# Patient Record
Sex: MALE | Race: WHITE | Employment: OTHER | URBAN - METROPOLITAN AREA
[De-identification: names, ages, dates, MRNs, and addresses within clinical notes are randomized per-mention and may not be internally consistent; named-entity substitution may affect disease eponyms.]

---

## 2017-02-13 PROBLEM — R33.9 URINARY RETENTION: Status: ACTIVE | Noted: 2017-02-13

## 2018-03-26 ENCOUNTER — ANESTHESIA EVENT (OUTPATIENT)
Dept: SURGERY | Age: 78
End: 2018-03-26
Payer: MEDICARE

## 2018-03-26 RX ORDER — DEXTROMETHORPHAN HYDROBROMIDE, GUAIFENESIN 5; 100 MG/5ML; MG/5ML
1300 LIQUID ORAL
COMMUNITY

## 2018-03-27 ENCOUNTER — ANESTHESIA (OUTPATIENT)
Dept: SURGERY | Age: 78
End: 2018-03-27
Payer: MEDICARE

## 2018-03-27 ENCOUNTER — APPOINTMENT (OUTPATIENT)
Dept: GENERAL RADIOLOGY | Age: 78
End: 2018-03-27
Attending: UROLOGY
Payer: MEDICARE

## 2018-03-27 ENCOUNTER — HOSPITAL ENCOUNTER (OUTPATIENT)
Age: 78
Setting detail: OUTPATIENT SURGERY
Discharge: HOME | End: 2018-03-27
Attending: UROLOGY | Admitting: UROLOGY
Payer: MEDICARE

## 2018-03-27 VITALS
HEART RATE: 62 BPM | TEMPERATURE: 97.4 F | RESPIRATION RATE: 17 BRPM | BODY MASS INDEX: 34.25 KG/M2 | HEIGHT: 68 IN | OXYGEN SATURATION: 91 % | DIASTOLIC BLOOD PRESSURE: 51 MMHG | WEIGHT: 226 LBS | SYSTOLIC BLOOD PRESSURE: 133 MMHG

## 2018-03-27 PROCEDURE — 77030018836 HC SOL IRR NACL ICUM -A: Performed by: UROLOGY

## 2018-03-27 PROCEDURE — 74011250636 HC RX REV CODE- 250/636: Performed by: NURSE ANESTHETIST, CERTIFIED REGISTERED

## 2018-03-27 PROCEDURE — 74011250637 HC RX REV CODE- 250/637: Performed by: NURSE ANESTHETIST, CERTIFIED REGISTERED

## 2018-03-27 PROCEDURE — C1758 CATHETER, URETERAL: HCPCS | Performed by: UROLOGY

## 2018-03-27 PROCEDURE — 74011250636 HC RX REV CODE- 250/636

## 2018-03-27 PROCEDURE — 74011250637 HC RX REV CODE- 250/637: Performed by: UROLOGY

## 2018-03-27 PROCEDURE — 76060000039 HC ANESTHESIA 4 TO 4.5 HR: Performed by: UROLOGY

## 2018-03-27 PROCEDURE — 88305 TISSUE EXAM BY PATHOLOGIST: CPT | Performed by: UROLOGY

## 2018-03-27 PROCEDURE — 77030029290 HC ELECTRD LP CUT OCOA -F: Performed by: UROLOGY

## 2018-03-27 PROCEDURE — 77030020782 HC GWN BAIR PAWS FLX 3M -B: Performed by: UROLOGY

## 2018-03-27 PROCEDURE — 76010000175 HC OR TIME 4 TO 4.5 HR INTENSV-TIER 1: Performed by: UROLOGY

## 2018-03-27 PROCEDURE — 77030032490 HC SLV COMPR SCD KNE COVD -B: Performed by: UROLOGY

## 2018-03-27 PROCEDURE — 74011000250 HC RX REV CODE- 250

## 2018-03-27 PROCEDURE — 76210000006 HC OR PH I REC 0.5 TO 1 HR: Performed by: UROLOGY

## 2018-03-27 PROCEDURE — 77030003481 HC NDL BIOP GUN BARD -B: Performed by: UROLOGY

## 2018-03-27 PROCEDURE — G0416 PROSTATE BIOPSY, ANY MTHD: HCPCS | Performed by: UROLOGY

## 2018-03-27 PROCEDURE — 74011636320 HC RX REV CODE- 636/320: Performed by: UROLOGY

## 2018-03-27 PROCEDURE — 74420 UROGRAPHY RTRGR +-KUB: CPT

## 2018-03-27 PROCEDURE — 77030020268 HC MISC GENERAL SUPPLY: Performed by: UROLOGY

## 2018-03-27 PROCEDURE — 77030010509 HC AIRWY LMA MSK TELE -A: Performed by: ANESTHESIOLOGY

## 2018-03-27 PROCEDURE — 77030019927 HC TBNG IRR CYSTO BAXT -A: Performed by: UROLOGY

## 2018-03-27 PROCEDURE — C2617 STENT, NON-COR, TEM W/O DEL: HCPCS | Performed by: UROLOGY

## 2018-03-27 PROCEDURE — 77030020441 HC CATH IMGR TRQ BERN BSC -B: Performed by: UROLOGY

## 2018-03-27 PROCEDURE — 74011250636 HC RX REV CODE- 250/636: Performed by: UROLOGY

## 2018-03-27 PROCEDURE — 76210000021 HC REC RM PH II 0.5 TO 1 HR: Performed by: UROLOGY

## 2018-03-27 PROCEDURE — C1769 GUIDE WIRE: HCPCS | Performed by: UROLOGY

## 2018-03-27 DEVICE — URETERAL STENT
Type: IMPLANTABLE DEVICE | Site: URETER | Status: FUNCTIONAL
Brand: POLARIS™ ULTRA

## 2018-03-27 RX ORDER — MIDAZOLAM HYDROCHLORIDE 1 MG/ML
INJECTION, SOLUTION INTRAMUSCULAR; INTRAVENOUS AS NEEDED
Status: DISCONTINUED | OUTPATIENT
Start: 2018-03-27 | End: 2018-03-27 | Stop reason: HOSPADM

## 2018-03-27 RX ORDER — FAMOTIDINE 20 MG/1
20 TABLET, FILM COATED ORAL ONCE
Status: COMPLETED | OUTPATIENT
Start: 2018-03-27 | End: 2018-03-27

## 2018-03-27 RX ORDER — FENTANYL CITRATE 50 UG/ML
INJECTION, SOLUTION INTRAMUSCULAR; INTRAVENOUS AS NEEDED
Status: DISCONTINUED | OUTPATIENT
Start: 2018-03-27 | End: 2018-03-27 | Stop reason: HOSPADM

## 2018-03-27 RX ORDER — LIDOCAINE HYDROCHLORIDE 20 MG/ML
INJECTION, SOLUTION EPIDURAL; INFILTRATION; INTRACAUDAL; PERINEURAL AS NEEDED
Status: DISCONTINUED | OUTPATIENT
Start: 2018-03-27 | End: 2018-03-27 | Stop reason: HOSPADM

## 2018-03-27 RX ORDER — SODIUM CHLORIDE, SODIUM LACTATE, POTASSIUM CHLORIDE, CALCIUM CHLORIDE 600; 310; 30; 20 MG/100ML; MG/100ML; MG/100ML; MG/100ML
75 INJECTION, SOLUTION INTRAVENOUS CONTINUOUS
Status: DISCONTINUED | OUTPATIENT
Start: 2018-03-27 | End: 2018-03-27 | Stop reason: HOSPADM

## 2018-03-27 RX ORDER — MORPHINE SULFATE 2 MG/ML
2 INJECTION, SOLUTION INTRAMUSCULAR; INTRAVENOUS AS NEEDED
Status: DISCONTINUED | OUTPATIENT
Start: 2018-03-27 | End: 2018-03-27 | Stop reason: HOSPADM

## 2018-03-27 RX ORDER — FLUCONAZOLE 2 MG/ML
200 INJECTION, SOLUTION INTRAVENOUS
Status: COMPLETED | OUTPATIENT
Start: 2018-03-27 | End: 2018-03-27

## 2018-03-27 RX ORDER — ROCURONIUM BROMIDE 10 MG/ML
INJECTION, SOLUTION INTRAVENOUS AS NEEDED
Status: DISCONTINUED | OUTPATIENT
Start: 2018-03-27 | End: 2018-03-27 | Stop reason: HOSPADM

## 2018-03-27 RX ORDER — SODIUM CHLORIDE 0.9 % (FLUSH) 0.9 %
5-10 SYRINGE (ML) INJECTION EVERY 8 HOURS
Status: DISCONTINUED | OUTPATIENT
Start: 2018-03-27 | End: 2018-03-27 | Stop reason: HOSPADM

## 2018-03-27 RX ORDER — DEXAMETHASONE SODIUM PHOSPHATE 4 MG/ML
INJECTION, SOLUTION INTRA-ARTICULAR; INTRALESIONAL; INTRAMUSCULAR; INTRAVENOUS; SOFT TISSUE AS NEEDED
Status: DISCONTINUED | OUTPATIENT
Start: 2018-03-27 | End: 2018-03-27 | Stop reason: HOSPADM

## 2018-03-27 RX ORDER — PROPOFOL 10 MG/ML
INJECTION, EMULSION INTRAVENOUS AS NEEDED
Status: DISCONTINUED | OUTPATIENT
Start: 2018-03-27 | End: 2018-03-27 | Stop reason: HOSPADM

## 2018-03-27 RX ORDER — OXYCODONE AND ACETAMINOPHEN 5; 325 MG/1; MG/1
1 TABLET ORAL
Qty: 16 TAB | Refills: 0 | Status: SHIPPED | OUTPATIENT
Start: 2018-03-27 | End: 2018-04-26

## 2018-03-27 RX ORDER — ONDANSETRON 2 MG/ML
INJECTION INTRAMUSCULAR; INTRAVENOUS AS NEEDED
Status: DISCONTINUED | OUTPATIENT
Start: 2018-03-27 | End: 2018-03-27 | Stop reason: HOSPADM

## 2018-03-27 RX ORDER — OXYCODONE AND ACETAMINOPHEN 5; 325 MG/1; MG/1
1 TABLET ORAL
Status: COMPLETED | OUTPATIENT
Start: 2018-03-27 | End: 2018-03-27

## 2018-03-27 RX ORDER — NEOSTIGMINE METHYLSULFATE 1 MG/ML
INJECTION INTRAVENOUS AS NEEDED
Status: DISCONTINUED | OUTPATIENT
Start: 2018-03-27 | End: 2018-03-27 | Stop reason: HOSPADM

## 2018-03-27 RX ORDER — GLYCOPYRROLATE 0.2 MG/ML
INJECTION INTRAMUSCULAR; INTRAVENOUS AS NEEDED
Status: DISCONTINUED | OUTPATIENT
Start: 2018-03-27 | End: 2018-03-27 | Stop reason: HOSPADM

## 2018-03-27 RX ORDER — SODIUM CHLORIDE 0.9 % (FLUSH) 0.9 %
5-10 SYRINGE (ML) INJECTION AS NEEDED
Status: DISCONTINUED | OUTPATIENT
Start: 2018-03-27 | End: 2018-03-27 | Stop reason: HOSPADM

## 2018-03-27 RX ORDER — NALOXONE HYDROCHLORIDE 0.4 MG/ML
0.1 INJECTION, SOLUTION INTRAMUSCULAR; INTRAVENOUS; SUBCUTANEOUS ONCE
Status: DISCONTINUED | OUTPATIENT
Start: 2018-03-27 | End: 2018-03-27 | Stop reason: HOSPADM

## 2018-03-27 RX ORDER — CIPROFLOXACIN 2 MG/ML
400 INJECTION, SOLUTION INTRAVENOUS
Status: COMPLETED | OUTPATIENT
Start: 2018-03-27 | End: 2018-03-27

## 2018-03-27 RX ORDER — EPHEDRINE SULFATE 50 MG/ML
INJECTION, SOLUTION INTRAVENOUS AS NEEDED
Status: DISCONTINUED | OUTPATIENT
Start: 2018-03-27 | End: 2018-03-27 | Stop reason: HOSPADM

## 2018-03-27 RX ADMIN — NEOSTIGMINE METHYLSULFATE 3 MG: 1 INJECTION INTRAVENOUS at 17:15

## 2018-03-27 RX ADMIN — FENTANYL CITRATE 50 MCG: 50 INJECTION, SOLUTION INTRAMUSCULAR; INTRAVENOUS at 13:30

## 2018-03-27 RX ADMIN — MIDAZOLAM HYDROCHLORIDE 1 MG: 1 INJECTION, SOLUTION INTRAMUSCULAR; INTRAVENOUS at 13:58

## 2018-03-27 RX ADMIN — EPHEDRINE SULFATE 10 MG: 50 INJECTION, SOLUTION INTRAVENOUS at 14:45

## 2018-03-27 RX ADMIN — PROPOFOL 20 MG: 10 INJECTION, EMULSION INTRAVENOUS at 13:53

## 2018-03-27 RX ADMIN — FENTANYL CITRATE 50 MCG: 50 INJECTION, SOLUTION INTRAMUSCULAR; INTRAVENOUS at 13:25

## 2018-03-27 RX ADMIN — PROPOFOL 100 MG: 10 INJECTION, EMULSION INTRAVENOUS at 13:25

## 2018-03-27 RX ADMIN — PROPOFOL 20 MG: 10 INJECTION, EMULSION INTRAVENOUS at 14:40

## 2018-03-27 RX ADMIN — EPHEDRINE SULFATE 10 MG: 50 INJECTION, SOLUTION INTRAVENOUS at 16:56

## 2018-03-27 RX ADMIN — SODIUM CHLORIDE, SODIUM LACTATE, POTASSIUM CHLORIDE, AND CALCIUM CHLORIDE 75 ML/HR: 600; 310; 30; 20 INJECTION, SOLUTION INTRAVENOUS at 11:41

## 2018-03-27 RX ADMIN — MIDAZOLAM HYDROCHLORIDE 1 MG: 1 INJECTION, SOLUTION INTRAMUSCULAR; INTRAVENOUS at 13:19

## 2018-03-27 RX ADMIN — OXYCODONE HYDROCHLORIDE AND ACETAMINOPHEN 1 TABLET: 5; 325 TABLET ORAL at 18:21

## 2018-03-27 RX ADMIN — FLUCONAZOLE 200 MG: 200 INJECTION, SOLUTION INTRAVENOUS at 13:35

## 2018-03-27 RX ADMIN — SODIUM CHLORIDE, SODIUM LACTATE, POTASSIUM CHLORIDE, AND CALCIUM CHLORIDE: 600; 310; 30; 20 INJECTION, SOLUTION INTRAVENOUS at 14:30

## 2018-03-27 RX ADMIN — ROCURONIUM BROMIDE 20 MG: 10 INJECTION, SOLUTION INTRAVENOUS at 15:49

## 2018-03-27 RX ADMIN — FAMOTIDINE 20 MG: 20 TABLET ORAL at 11:41

## 2018-03-27 RX ADMIN — GLYCOPYRROLATE 0.6 MG: 0.2 INJECTION INTRAMUSCULAR; INTRAVENOUS at 17:15

## 2018-03-27 RX ADMIN — LIDOCAINE HYDROCHLORIDE 40 MG: 20 INJECTION, SOLUTION EPIDURAL; INFILTRATION; INTRACAUDAL; PERINEURAL at 13:25

## 2018-03-27 RX ADMIN — PROPOFOL 30 MG: 10 INJECTION, EMULSION INTRAVENOUS at 13:50

## 2018-03-27 RX ADMIN — CIPROFLOXACIN 400 MG: 2 INJECTION, SOLUTION INTRAVENOUS at 13:21

## 2018-03-27 RX ADMIN — ONDANSETRON 4 MG: 2 INJECTION INTRAMUSCULAR; INTRAVENOUS at 14:15

## 2018-03-27 RX ADMIN — EPHEDRINE SULFATE 5 MG: 50 INJECTION, SOLUTION INTRAVENOUS at 16:59

## 2018-03-27 RX ADMIN — DEXAMETHASONE SODIUM PHOSPHATE 4 MG: 4 INJECTION, SOLUTION INTRA-ARTICULAR; INTRALESIONAL; INTRAMUSCULAR; INTRAVENOUS; SOFT TISSUE at 13:30

## 2018-03-27 RX ADMIN — SODIUM CHLORIDE, SODIUM LACTATE, POTASSIUM CHLORIDE, AND CALCIUM CHLORIDE: 600; 310; 30; 20 INJECTION, SOLUTION INTRAVENOUS at 16:56

## 2018-03-27 NOTE — ANESTHESIA POSTPROCEDURE EVALUATION
Post-Anesthesia Evaluation and Assessment    Patient: Patrick Gamez MRN: [id number]  SSN: [id number]    YOB: 1940  Age: 65 y.o. 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 Nausea/Vomiting: None    Postoperative hydration reviewed and adequate. Pain:  Pain Scale 1: Numeric (0 - 10) (03/27/18 1828)  Pain Intensity 1: 6 (03/27/18 1828)   Managed    Neurological Status:   Neuro (WDL): Within Defined Limits (03/27/18 1132)   At baseline    Mental Status and Level of Consciousness: Arousable    Pulmonary Status:   O2 Device: Room air (03/27/18 1828)   Adequate oxygenation and airway patent    Complications related to anesthesia: None    Post-anesthesia assessment completed.  No concerns    Signed By: Kenrick Tejada MD     March 27, 2018

## 2018-03-27 NOTE — DISCHARGE INSTRUCTIONS
DISCHARGE SUMMARY from Nurse    PATIENT INSTRUCTIONS:    After general anesthesia or intravenous sedation, for 24 hours or while taking prescription Narcotics:  · Limit your activities  · Do not drive and operate hazardous machinery  · Do not make important personal or business decisions  · Do  not drink alcoholic beverages  · If you have not urinated within 8 hours after discharge, please contact your surgeon on call. Report the following to your surgeon:  · Excessive pain, swelling, redness or odor of or around the surgical area  · Temperature over 100.5  · Nausea and vomiting lasting longer than 4 hours or if unable to take medications  · Any signs of decreased circulation or nerve impairment to extremity: change in color, persistent  numbness, tingling, coldness or increase pain  · Any questions    What to do at Home:    *  Please give a list of your current medications to your Primary Care Provider. *  Please update this list whenever your medications are discontinued, doses are      changed, or new medications (including over-the-counter products) are added. *  Please carry medication information at all times in case of emergency situations. These are general instructions for a healthy lifestyle:    No smoking/ No tobacco products/ Avoid exposure to second hand smoke  Surgeon General's Warning:  Quitting smoking now greatly reduces serious risk to your health. Obesity, smoking, and sedentary lifestyle greatly increases your risk for illness    A healthy diet, regular physical exercise & weight monitoring are important for maintaining a healthy lifestyle    You may be retaining fluid if you have a history of heart failure or if you experience any of the following symptoms:  Weight gain of 3 pounds or more overnight or 5 pounds in a week, increased swelling in our hands or feet or shortness of breath while lying flat in bed.   Please call your doctor as soon as you notice any of these symptoms; do not wait until your next office visit. Recognize signs and symptoms of STROKE:    F-face looks uneven    A-arms unable to move or move unevenly    S-speech slurred or non-existent    T-time-call 911 as soon as signs and symptoms begin-DO NOT go       Back to bed or wait to see if you get better-TIME IS BRAIN. Warning Signs of HEART ATTACK     Call 911 if you have these symptoms:   Chest discomfort. Most heart attacks involve discomfort in the center of the chest that lasts more than a few minutes, or that goes away and comes back. It can feel like uncomfortable pressure, squeezing, fullness, or pain.  Discomfort in other areas of the upper body. Symptoms can include pain or discomfort in one or both arms, the back, neck, jaw, or stomach.  Shortness of breath with or without chest discomfort.  Other signs may include breaking out in a cold sweat, nausea, or lightheadedness. Don't wait more than five minutes to call 911 - MINUTES MATTER! Fast action can save your life. Calling 911 is almost always the fastest way to get lifesaving treatment. Emergency Medical Services staff can begin treatment when they arrive -- up to an hour sooner than if someone gets to the hospital by car. The discharge information has been reviewed with the patient and spouse. The patient and spouse verbalized understanding. Discharge medications reviewed with the patient and spouse and appropriate educational materials and side effects teaching were provided. Ureteroscopy: What to Expect at [address] [place] [address]  For several hours after the procedure you may have a burning feeling when you urinate. This feeling should go away within a day. Drinking a lot of water can help. Your doctor also may advise you to take medicine that numbs the burning. This medicine is called phenazopyridine. It is available by prescription and over the counter. Brand names include Pyridium and Uristat.   You may have some blood in your urine for 2 or 3 days. Your doctor may prescribe an antibiotic for a day or two. This will help prevent an infection. This care sheet gives you a general idea about how long it will take for you to recover. But each person recovers at a different pace. Follow the steps below to feel better as quickly as possible. How can you care for yourself at home? Activity  ? · You can go back to work and other activities the next day. Diet  ? · Try to drink two 8-ounce glasses of water each hour for 2 hours after the procedure. This may help ease the burning when you urinate. Medicines  ? · Your doctor will tell you if and when you can restart your medicines. He or she will also give you instructions about taking any new medicines. ? · If you take blood thinners, such as warfarin (Coumadin), clopidogrel (Plavix), or aspirin, be sure to talk to your doctor. He or she will tell you if and when to start taking those medicines again. Make sure that you understand exactly what your doctor wants you to do. ? · If you take medicine to stop the burning when you urinate, take it exactly as recommended. Be safe with medicines. Call your doctor if you think you are having a problem with your medicine. You will get more details on the specific medicine your doctor recommends. ? · If your doctor prescribed antibiotics, take them as directed. Do not stop taking them just because you feel better. You need to take the full course of antibiotics. ? · Ask your doctor if you can take an over-the-counter pain medicine, such as acetaminophen (Tylenol), ibuprofen (Advil, Motrin), or naproxen (Aleve). Read and follow all instructions on the label. Do not take two or more pain medicines at the same time unless the doctor told you to. Many pain medicines have acetaminophen, which is Tylenol. Too much acetaminophen (Tylenol) can be harmful. ? Heat  ? · Take a warm bath. This may soothe the burning.    ? · You also can hold a warm washcloth over your urethra for comfort. (The urethra is where your urine comes out.)   Follow-up care is a key part of your treatment and safety. Be sure to make and go to all appointments, and call your doctor if you are having problems. It's also a good idea to know your test results and keep a list of the medicines you take. When should you call for help? Call 911 anytime you think you may need emergency care. For example, call if:  ? · You passed out (lost consciousness). ? · You have chest pain, are short of breath, or cough up blood. ?Call your doctor now or seek immediate medical care if:  ? · You have pain that does not get better after you take pain medicine. ? · You have new or more blood clots in your urine. (It is normal for the urine to be pink for a few days.)   ? · You cannot urinate. ? · You have symptoms of a urinary tract infection. These may include:  ¨ Pain or burning when you urinate. ¨ A frequent need to urinate without being able to pass much urine. ¨ Pain in the flank, which is just below the rib cage and above the waist on either side of the back. ¨ Blood in the urine. ¨ A fever. ? · You are sick to your stomach or cannot drink fluids. ? · You have signs of a blood clot in your leg (called a deep vein thrombosis), such as:  ¨ Pain in the calf, back of the knee, thigh, or groin. ¨ Redness and swelling in your leg. ? Watch closely for changes in your health, and be sure to contact your doctor if you are having any problems. Where can you learn more? Go to http://ro-lexi.info/. Enter N714 in the search box to learn more about \"Ureteroscopy: What to Expect at Home. \"  Current as of: May 12, 2017  Content Version: 11.4  © 6310-4467 Healthwise[employer]. Care instructions adapted under license by [employer] (which disclaims liability or warranty for this information).  If you have questions about a medical condition or this instruction, always ask your healthcare professional. [place] [address] any warranty or liability for your use of this information. __     Ureteral Stent Placement: What to Expect at [address]  A ureteral (say \"you-REE-ter-ul\") stent is a thin, hollow tube that is placed in the ureter to help urine pass from the kidney into the bladder. Ureters are the tubes that connect the kidneys to the bladder. You may have a small amount of blood in your urine for 1 to 3 days after the procedure. While the stent is in place, you may have to urinate more often, feel a sudden need to urinate, or feel like you cannot completely empty your bladder. You may feel some pain when you urinate or do strenuous activity. You also may notice a small amount of blood in your urine after strenuous activities. These side effects usually do not prevent people from doing their normal daily activities. You may have a thin string coming out of your urethra. Your urethra is the tube that carries urine from your bladder to outside your body. This string is attached to the stent. Try not to pull on the string. The doctor will use the string to pull out the stent when you no longer need it. After the procedure, urine may flow better from your kidneys to your bladder. A ureteral stent may be left in place for several days or for as long as several months. Your doctor will take it out when you no longer need it. This care sheet gives you a general idea about how long it will take for you to recover. But each person recovers at a different pace. Follow the steps below to get better as quickly as possible. How can you care for yourself at home? Activity  ? · Rest when you feel tired. Getting enough sleep will help you recover. ? · Avoid strenuous activities, such as bicycle riding, jogging, weight lifting, or aerobic exercise, until your doctor says it is okay. ? · Ask your doctor when you can drive again.    ? · Most people are able to return to work the day after the procedure. If your work requires intense activity, you may feel pain in your kidney area or get tired easily. If this happens, you may need to do less strenuous activities while the stent is in. ? · Ask your doctor when it is okay for you to have sex. Diet  ? · You can eat your normal diet. If your stomach is upset, try bland, low-fat foods like plain rice, broiled chicken, toast, and yogurt. ? · Drink plenty of fluids (unless your doctor tells you not to). Medicines  ? · Your doctor will tell you if and when you can restart your medicines. He or she will also give you instructions about taking any new medicines. ? · If you take blood thinners, such as warfarin (Coumadin), clopidogrel (Plavix), or aspirin, be sure to talk to your doctor. He or she will tell you if and when to start taking those medicines again. Make sure that you understand exactly what your doctor wants you to do. ? · Be safe with medicines. Take pain medicines exactly as directed. ¨ If the doctor gave you a prescription medicine for pain, take it as prescribed. ¨ If you are not taking a prescription pain medicine, ask your doctor if you can take an over-the-counter medicine. ? · If you think your pain medicine is making you sick to your stomach:  ¨ Take your medicine after meals (unless your doctor has told you not to). ¨ Ask your doctor for a different pain medicine. ? · If your doctor prescribed antibiotics, take them as directed. Do not stop taking them just because you feel better. You need to take the full course of antibiotics. Follow-up care is a key part of your treatment and safety. Be sure to make and go to all appointments, and call your doctor if you are having problems. It's also a good idea to know your test results and keep a list of the medicines you take. When should you call for help? Call 911 anytime you think you may need emergency care.  For example, call if:  ? · You passed out (lost consciousness). ? · You have severe trouble breathing. ? · You have sudden chest pain and shortness of breath, or you cough up blood. ? · You have severe belly pain. ?Call your doctor now or seek immediate medical care if:  ? · Part or all of the stent comes out of your urethra. ? · You have pain that does not get better after you take pain medicine. ? · You have symptoms of a urinary infection. For example:  ¨ You have blood or pus in your urine. ¨ You have pain in your back just below your rib cage. This is called flank pain. ¨ You have a fever, chills, or body aches. ¨ It hurts to urinate. ¨ You have groin or belly pain. ? · You cannot control when you urinate, or you leak urine. ? Watch closely for changes in your health, and be sure to contact your doctor if you have any problems. Where can you learn more? Go to http://ro-lexi.info/. Enter Q582 in the search box to learn more about \"Ureteral Stent Placement: What to Expect at Home. \"  Current as of: May 12, 2017  Content Version: 11.4  © 4682-6793 [employer]. Care instructions adapted under license by [employer] (which disclaims liability or warranty for this information). If you have questions about a medical condition or this instruction, always ask your healthcare professional. [place] [address] any warranty or liability for your use of this information. Learning About Urinary Catheter Care to Prevent Infection  What is a urinary catheter? A urinary catheter is a flexible plastic tube used to drain urine from your bladder when you can't urinate on your own. The catheter allows urine to drain from the bladder into a bag. Two types of drainage bags may be used with a urinary catheter. · A bedside bag is a large bag that you can hang on the side of your bed or on a chair.  You can use it overnight or anytime you will be sitting or lying down for a long time. · A leg bag is a small bag that you can use during the day. It is usually attached to your thigh or calf and hidden under your clothes. Having a urinary catheter increases your risk of getting a urinary tract infection. Germs may get on the catheter and cause an infection in your bladder or kidneys. The longer you have a catheter, the more likely it is that you will get an infection. You can help prevent this problem with good hygiene and careful handling of your catheter and drainage bags. How can you help prevent infection? Take care to be clean  · Always wash your hands well before and after you handle your catheter. · Clean the skin around the catheter twice a day using soap and water. Dry with a clean towel afterward. You can shower with your catheter and drainage bag in place unless your doctor told you not to. · When you clean around the catheter, check the surrounding skin for signs of infection. Look for things like pus or irritated, swollen, red, or tender skin around the catheter. Be careful with your drainage bag  · Always keep the drainage bag below the level of your bladder. This will help keep urine from flowing back into your bladder. · Check often to see that urine is flowing through the catheter into the drainage bag. · Empty the drainage bag when it is half full. This will keep it from overflowing or backing up. · When you empty the drainage bag, do not let the tubing or drain spout touch anything. Be careful with your catheter  · Do not unhook the catheter from the drain tube. That could let germs get into the tube. · Make sure that the catheter tubing does not get twisted or kinked. · Do not tug or pull on the catheter. And make sure that the drainage bag does not drag or pull on the catheter. · Do not put powder or lotion on the skin around the catheter. · Talk with your doctor about your options for sexual intercourse while wearing a catheter.   How do you empty a urine drainage bag? If your doctor has asked you to keep a record, write down the amount of urine in the bag before you empty it. Wash your hands before and after you touch the bag. 1. Remove the drain spout from its sleeve at the bottom of the drainage bag.  2. Open the valve on the drain spout. Let the urine flow out into the toilet or a container. Be careful not to let the tubing or drain spout touch anything. 3. After you empty the bag, wipe off any liquid on the end of the drain spout. Close the valve. Then put the drain spout back into its sleeve at the bottom of the collection bag. How do you add a bedside bag to a leg bag? Wash your hands before and after you handle the bags. 1. Empty the leg bag attached to the catheter. 2. Put a clean towel under the leg bag.  3. Use an alcohol wipe to clean the tip of the bedside bag. Then connect the bedside bag to the leg bag. How can you clean a bedside drainage bag? Many people clean their bedside bag in the morning if they switch to a leg bag. To clean a bedside drainage ba. Remove the bedside bag from the leg bag.  2. Fill the bag with 2 parts vinegar and 3 parts water. Let it stand for 20 minutes. 3. Empty the bag, and let it air dry. When should you call for help? Call your doctor now or seek immediate medical care if:  · You have symptoms of a urinary infection. These may include:  ¨ Pain or burning when you urinate. ¨ A frequent need to urinate without being able to pass much urine. ¨ Pain in the flank, which is just below the rib cage and above the waist on either side of the back. ¨ Blood in your urine. ¨ A fever. · Your urine smells bad. · You see large blood clots in your urine. · No urine or very little urine is flowing into the bag for 4 or more hours.   Watch closely for changes in your health, and be sure to contact your doctor if:  · The area around the catheter becomes irritated, swollen, red, or tender, or there is pus draining from it. · Urine is leaking from the place where the catheter enters your body. Follow-up care is a key part of your treatment and safety. Be sure to make and go to all appointments, and call your doctor if you are having problems. It's also a good idea to know your test results and keep a list of the medicines you take. Where can you learn more? Go to http://ro-lexi.info/. Enter C910 in the search box to learn more about \"Learning About Urinary Catheter Care to Prevent Infection. \"  Current as of: May 12, 2017  Content Version: 11.4  © 0387-5324 Healthwise, [employer]. Care instructions adapted under license by [employer] (which disclaims liability or warranty for this information). If you have questions about a medical condition or this instruction, always ask your healthcare professional. [address] any warranty or liability for your use of this information.     _________________________________________________________________________________________________________________________________

## 2018-03-27 NOTE — IP AVS SNAPSHOT
[address] [address] 
[phone] Patient: Juan Walters MRN: [id number] :1940 About your hospitalization You were admitted on:  2018 You last received care in the:  [facility] PACU You were discharged on:  2018 Why you were hospitalized Your primary diagnosis was:  Not on File Follow-up Information Follow up With Details Comments Contact Info Minesh Ibarra MD   [address] [facility] [address] [facility] 
[phone] Tasia Craven MD Follow up in 3 day(s) Follow up Friday to have urinary catheter removed. [address] [facility] [address] [facility] 
[phone] Discharge Orders None A check luz indicates which time of day the medication should be taken. My Medications START taking these medications Instructions Each Dose to Equal  
 Morning Noon Evening Bedtime  
 oxyCODONE-acetaminophen 5-325 mg per tablet Commonly known as:  PERCOCET Your last dose was: Your next dose is: Take 1 Tab by mouth every four (4) hours as needed for Pain. Max Daily Amount: 6 Tabs. 1 Tab CHANGE how you take these medications Instructions Each Dose to Equal  
 Morning Noon Evening Bedtime  
 tamsulosin 0.4 mg capsule Commonly known as:  FLOMAX What changed:  how much to take Your last dose was: Your next dose is: Take 2 Caps by mouth nightly. 0.8 mg  
    
   
   
   
  
  
CONTINUE taking these medications Instructions Each Dose to Equal  
 Morning Noon Evening Bedtime  
 acetaminophen 650 mg Tber Commonly known as:  TYLENOL Your last dose was: Your next dose is: Take 1,300 mg by mouth every eight (8) hours as needed. 1300 mg  
    
   
   
   
  
 amLODIPine 5 mg tablet Commonly known as:  Drea Abernathy Your last dose was: Your next dose is:    
   
   
      
   
   
   
  
 aspirin delayed-release 81 mg tablet Your last dose was: Your next dose is: Take  by mouth daily. finasteride 5 mg tablet Commonly known as:  PROSCAR Your last dose was: Your next dose is: Take 1 Tab by mouth daily. 5 mg  
    
   
   
   
  
 fluconazole 100 mg tablet Commonly known as:  DIFLUCAN Your last dose was: Your next dose is: Take 1 Tab by mouth daily for 7 days. FDA advises cautious prescribing of oral fluconazole in pregnancy. 100 mg  
    
   
   
   
  
 hydroCHLOROthiazide 25 mg tablet Commonly known as:  HYDRODIURIL Your last dose was: Your next dose is:    
   
   
      
   
   
   
  
 levoFLOXacin 500 mg tablet Commonly known as:  Mittie Bitter Your last dose was: Your next dose is: Take 1 Tab by mouth daily. 500 mg  
    
   
   
   
  
 metoprolol succinate 50 mg XL tablet Commonly known as:  TOPROL-XL Your last dose was: Your next dose is:    
   
   
      
   
   
   
  
 pantoprazole 40 mg tablet Commonly known as:  PROTONIX Your last dose was: Your next dose is:    
   
   
      
   
   
   
  
 simvastatin 20 mg tablet Commonly known as:  ZOCOR Your last dose was: Your next dose is:    
   
   
      
   
   
   
  
  
  
Where to Get Your Medications Information on where to get these meds will be given to you by the nurse or doctor. ! Ask your nurse or doctor about these medications  
  oxyCODONE-acetaminophen 5-325 mg per tablet Opioid Education  Prescription Opioids: What You Need to Know: 
 
 
After general anesthesia or intravenous sedation, for 24 hours or while taking prescription Narcotics: · Limit your activities · Do not drive and operate hazardous machinery · Do not make important personal or business decisions · Do  not drink alcoholic beverages · If you have not urinated within 8 hours after discharge, please contact your surgeon on call. Report the following to your surgeon: 
· Excessive pain, swelling, redness or odor of or around the surgical area · Temperature over 100.5 · Nausea and vomiting lasting longer than 4 hours or if unable to take medications · Any signs of decreased circulation or nerve impairment to extremity: change in color, persistent  numbness, tingling, coldness or increase pain · Any questions What to do at Home: *  Please give a list of your current medications to your Primary Care Provider. *  Please update this list whenever your medications are discontinued, doses are 
    changed, or new medications (including over-the-counter products) are added. *  Please carry medication information at all times in case of emergency situations. These are general instructions for a healthy lifestyle: No smoking/ No tobacco products/ Avoid exposure to second hand smoke Surgeon General's Warning:  Quitting smoking now greatly reduces serious risk to your health. Obesity, smoking, and sedentary lifestyle greatly increases your risk for illness A healthy diet, regular physical exercise & weight monitoring are important for maintaining a healthy lifestyle You may be retaining fluid if you have a history of heart failure or if you experience any of the following symptoms:  Weight gain of 3 pounds or more overnight or 5 pounds in a week, increased swelling in our hands or feet or shortness of breath while lying flat in bed. Please call your doctor as soon as you notice any of these symptoms; do not wait until your next office visit. Recognize signs and symptoms of STROKE: 
 
F-face looks uneven A-arms unable to move or move unevenly S-speech slurred or non-existent T-time-call 911 as soon as signs and symptoms begin-DO NOT go Back to bed or wait to see if you get better-TIME IS BRAIN. Warning Signs of HEART ATTACK Call 911 if you have these symptoms: 
? Chest discomfort. Most heart attacks involve discomfort in the center of the chest that lasts more than a few minutes, or that goes away and comes back. It can feel like uncomfortable pressure, squeezing, fullness, or pain. ? Discomfort in other areas of the upper body. Symptoms can include pain or discomfort in one or both arms, the back, neck, jaw, or stomach. ? Shortness of breath with or without chest discomfort. ? Other signs may include breaking out in a cold sweat, nausea, or lightheadedness. Don't wait more than five minutes to call [address]! Fast action can save your life. Calling 911 is almost always the fastest way to get lifesaving treatment. Emergency Medical Services staff can begin treatment when they arrive  up to an hour sooner than if someone gets to the hospital by car. The discharge information has been reviewed with the patient and spouse. The patient and spouse verbalized understanding. Discharge medications reviewed with the patient and spouse and appropriate educational materials and side effects teaching were provided. Ureteroscopy: What to Expect at [facility] Your Recovery For several hours after the procedure you may have a burning feeling when you urinate. This feeling should go away within a day. Drinking a lot of water can help. Your doctor also may advise you to take medicine that numbs the burning. This medicine is called phenazopyridine.  It is available by prescription and over the counter. Brand names include Pyridium and Uristat. You may have some blood in your urine for 2 or 3 days. Your doctor may prescribe an antibiotic for a day or two. This will help prevent an infection. This care sheet gives you a general idea about how long it will take for you to recover. But each person recovers at a different pace. Follow the steps below to feel better as quickly as possible. How can you care for yourself at home? Activity ? · You can go back to work and other activities the next day. Diet ? · Try to drink two 8-ounce glasses of water each hour for 2 hours after the procedure. This may help ease the burning when you urinate. Medicines ? · Your doctor will tell you if and when you can restart your medicines. He or she will also give you instructions about taking any new medicines. ? · If you take blood thinners, such as warfarin (Coumadin), clopidogrel (Plavix), or aspirin, be sure to talk to your doctor. He or she will tell you if and when to start taking those medicines again. Make sure that you understand exactly what your doctor wants you to do. ? · If you take medicine to stop the burning when you urinate, take it exactly as recommended. Be safe with medicines. Call your doctor if you think you are having a problem with your medicine. You will get more details on the specific medicine your doctor recommends. ? · If your doctor prescribed antibiotics, take them as directed. Do not stop taking them just because you feel better. You need to take the full course of antibiotics. ? · Ask your doctor if you can take an over-the-counter pain medicine, such as acetaminophen (Tylenol), ibuprofen (Advil, Motrin), or naproxen (Aleve). Read and follow all instructions on the label. Do not take two or more pain medicines at the same time unless the doctor told you to. Many pain medicines have acetaminophen, which is Tylenol.  Too much acetaminophen (Tylenol) can be harmful. ? Heat ? · Take a warm bath. This may soothe the burning. ? · You also can hold a warm washcloth over your urethra for comfort. (The urethra is where your urine comes out.) Follow-up care is a key part of your treatment and safety. Be sure to make and go to all appointments, and call your doctor if you are having problems. It's also a good idea to know your test results and keep a list of the medicines you take. When should you call for help? Call 911 anytime you think you may need emergency care. For example, call if: 
? · You passed out (lost consciousness). ? · You have chest pain, are short of breath, or cough up blood. ?Call your doctor now or seek immediate medical care if: 
? · You have pain that does not get better after you take pain medicine. ? · You have new or more blood clots in your urine. (It is normal for the urine to be pink for a few days.) ? · You cannot urinate. ? · You have symptoms of a urinary tract infection. These may include: 
¨ Pain or burning when you urinate. ¨ A frequent need to urinate without being able to pass much urine. ¨ Pain in the flank, which is just below the rib cage and above the waist on either side of the back. ¨ Blood in the urine. ¨ A fever. ? · You are sick to your stomach or cannot drink fluids. ? · You have signs of a blood clot in your leg (called a deep vein thrombosis), such as: 
¨ Pain in the calf, back of the knee, thigh, or groin. ¨ Redness and swelling in your leg. ? Watch closely for changes in your health, and be sure to contact your doctor if you are having any problems. Where can you learn more? Go to http://ro-lexi.info/. Enter E202 in the search box to learn more about \"Ureteroscopy: What to Expect at Home. \" Current as of: May 12, 2017 Content Version: 11.4 © 5936-8789 Healthwise, Incorporated.  Care instructions adapted under license by [address] (which disclaims liability or warranty for this information). If you have questions about a medical condition or this instruction, always ask your healthcare professional. [address] any warranty or liability for your use of this information. __ Ureteral Stent Placement: What to Expect at Home Your Recovery A ureteral (say \"you-REE-ter-ul\") stent is a thin, hollow tube that is placed in the ureter to help urine pass from the kidney into the bladder. Ureters are the tubes that connect the kidneys to the bladder. You may have a small amount of blood in your urine for 1 to 3 days after the procedure. While the stent is in place, you may have to urinate more often, feel a sudden need to urinate, or feel like you cannot completely empty your bladder. You may feel some pain when you urinate or do strenuous activity. You also may notice a small amount of blood in your urine after strenuous activities. These side effects usually do not prevent people from doing their normal daily activities. You may have a thin string coming out of your urethra. Your urethra is the tube that carries urine from your bladder to outside your body. This string is attached to the stent. Try not to pull on the string. The doctor will use the string to pull out the stent when you no longer need it. After the procedure, urine may flow better from your kidneys to your bladder. A ureteral stent may be left in place for several days or for as long as several months. Your doctor will take it out when you no longer need it. This care sheet gives you a general idea about how long it will take for you to recover. But each person recovers at a different pace. Follow the steps below to get better as quickly as possible. How can you care for yourself at home? Activity ? · Rest when you feel tired. Getting enough sleep will help you recover. ? · Avoid strenuous activities, such as bicycle riding, jogging, weight lifting, or aerobic exercise, until your doctor says it is okay. ? · Ask your doctor when you can drive again. ? · Most people are able to return to work the day after the procedure. If your work requires intense activity, you may feel pain in your kidney area or get tired easily. If this happens, you may need to do less strenuous activities while the stent is in. ? · Ask your doctor when it is okay for you to have sex. Diet ? · You can eat your normal diet. If your stomach is upset, try bland, low-fat foods like plain rice, broiled chicken, toast, and yogurt. ? · Drink plenty of fluids (unless your doctor tells you not to). Medicines ? · Your doctor will tell you if and when you can restart your medicines. He or she will also give you instructions about taking any new medicines. ? · If you take blood thinners, such as warfarin (Coumadin), clopidogrel (Plavix), or aspirin, be sure to talk to your doctor. He or she will tell you if and when to start taking those medicines again. Make sure that you understand exactly what your doctor wants you to do. ? · Be safe with medicines. Take pain medicines exactly as directed. ¨ If the doctor gave you a prescription medicine for pain, take it as prescribed. ¨ If you are not taking a prescription pain medicine, ask your doctor if you can take an over-the-counter medicine. ? · If you think your pain medicine is making you sick to your stomach: 
¨ Take your medicine after meals (unless your doctor has told you not to). ¨ Ask your doctor for a different pain medicine. ? · If your doctor prescribed antibiotics, take them as directed. Do not stop taking them just because you feel better. You need to take the full course of antibiotics. Follow-up care is a key part of your treatment and safety.  Be sure to make and go to all appointments, and call your doctor if you are having problems. It's also a good idea to know your test results and keep a list of the medicines you take. When should you call for help? Call 911 anytime you think you may need emergency care. For example, call if: 
? · You passed out (lost consciousness). ? · You have severe trouble breathing. ? · You have sudden chest pain and shortness of breath, or you cough up blood. ? · You have severe belly pain. ?Call your doctor now or seek immediate medical care if: 
? · Part or all of the stent comes out of your urethra. ? · You have pain that does not get better after you take pain medicine. ? · You have symptoms of a urinary infection. For example: ¨ You have blood or pus in your urine. ¨ You have pain in your back just below your rib cage. This is called flank pain. ¨ You have a fever, chills, or body aches. ¨ It hurts to urinate. ¨ You have groin or belly pain. ? · You cannot control when you urinate, or you leak urine. ? Watch closely for changes in your health, and be sure to contact your doctor if you have any problems. Where can you learn more? Go to http://ro-lexi.info/. Enter G438 in the search box to learn more about \"Ureteral Stent Placement: What to Expect at Home. \" Current as of: May 12, 2017 Content Version: 11.4 © 9826-3336 [employer]. Care instructions adapted under license by [employer] (which disclaims liability or warranty for this information). If you have questions about a medical condition or this instruction, always ask your healthcare professional. [address] [address] any warranty or liability for your use of this information. Learning About Urinary Catheter Care to Prevent Infection What is a urinary catheter? A urinary catheter is a flexible plastic tube used to drain urine from your bladder when you can't urinate on your own. The catheter allows urine to drain from the bladder into a bag. Two types of drainage bags may be used with a urinary catheter. · A bedside bag is a large bag that you can hang on the side of your bed or on a chair. You can use it overnight or anytime you will be sitting or lying down for a long time. · A leg bag is a small bag that you can use during the day. It is usually attached to your thigh or calf and hidden under your clothes. Having a urinary catheter increases your risk of getting a urinary tract infection. Germs may get on the catheter and cause an infection in your bladder or kidneys. The longer you have a catheter, the more likely it is that you will get an infection. You can help prevent this problem with good hygiene and careful handling of your catheter and drainage bags. How can you help prevent infection? Take care to be clean · Always wash your hands well before and after you handle your catheter. · Clean the skin around the catheter twice a day using soap and water. Dry with a clean towel afterward. You can shower with your catheter and drainage bag in place unless your doctor told you not to. · When you clean around the catheter, check the surrounding skin for signs of infection. Look for things like pus or irritated, swollen, red, or tender skin around the catheter. Be careful with your drainage bag · Always keep the drainage bag below the level of your bladder. This will help keep urine from flowing back into your bladder. · Check often to see that urine is flowing through the catheter into the drainage bag. · Empty the drainage bag when it is half full. This will keep it from overflowing or backing up. · When you empty the drainage bag, do not let the tubing or drain spout touch anything. Be careful with your catheter · Do not unhook the catheter from the drain tube. That could let germs get into the tube. · Make sure that the catheter tubing does not get twisted or kinked. · Do not tug or pull on the catheter. And make sure that the drainage bag does not drag or pull on the catheter. · Do not put powder or lotion on the skin around the catheter. · Talk with your doctor about your options for sexual intercourse while wearing a catheter. How do you empty a urine drainage bag? If your doctor has asked you to keep a record, write down the amount of urine in the bag before you empty it. Wash your hands before and after you touch the bag. 1. Remove the drain spout from its sleeve at the bottom of the drainage bag. 
2. Open the valve on the drain spout. Let the urine flow out into the toilet or a container. Be careful not to let the tubing or drain spout touch anything. 3. After you empty the bag, wipe off any liquid on the end of the drain spout. Close the valve. Then put the drain spout back into its sleeve at the bottom of the collection bag. How do you add a bedside bag to a leg bag? Wash your hands before and after you handle the bags. 1. Empty the leg bag attached to the catheter. 2. Put a clean towel under the leg bag. 
3. Use an alcohol wipe to clean the tip of the bedside bag. Then connect the bedside bag to the leg bag. How can you clean a bedside drainage bag? Many people clean their bedside bag in the morning if they switch to a leg bag. To clean a bedside drainage ba. Remove the bedside bag from the leg bag. 
2. Fill the bag with 2 parts vinegar and 3 parts water. Let it stand for 20 minutes. 3. Empty the bag, and let it air dry. When should you call for help? Call your doctor now or seek immediate medical care if: 
· You have symptoms of a urinary infection. These may include: 
¨ Pain or burning when you urinate. ¨ A frequent need to urinate without being able to pass much urine. ¨ Pain in the flank, which is just below the rib cage and above the waist on either side of the back. ¨ Blood in your urine. ¨ A fever. · Your urine smells bad. · You see large blood clots in your urine. · No urine or very little urine is flowing into the bag for 4 or more hours. Watch closely for changes in your health, and be sure to contact your doctor if: · The area around the catheter becomes irritated, swollen, red, or tender, or there is pus draining from it. · Urine is leaking from the place where the catheter enters your body. Follow-up care is a key part of your treatment and safety. Be sure to make and go to all appointments, and call your doctor if you are having problems. It's also a good idea to know your test results and keep a list of the medicines you take. Where can you learn more? Go to http://ro-lexi.info/. Enter C910 in the search box to learn more about \"Learning About Urinary Catheter Care to Prevent Infection. \" Current as of: May 12, 2017 Content Version: 11.4 © 3809-5487 [employer]. Care instructions adapted under license by [employer] (which disclaims liability or warranty for this information). If you have questions about a medical condition or this instruction, always ask your healthcare professional. [address] any warranty or liability for your use of this information. _________________________________________________________________________________________________________________________________ Introducing [facility]! Dear Jan Banks: Thank you for requesting a [email or web address] account. Our records indicate that you already have an active [email or web address] account. You can access your account anytime at [email or web address] Did you know that you can access your hospital and ER discharge instructions at any time in [email or web address]? You can also review all of your test results from your hospital stay or ER visit. Additional Information If you have questions, please visit the Frequently Asked Questions section of the I-Shake website at [email or web address] Remember, I-Shake is NOT to be used for urgent needs. For medical emergencies, dial 911. Now available from your iPhone and Android! Introducing Doron Hooper As a Arabella Aguiar patient, I wanted to make you aware of our electronic visit tool called Doron Hooper. FreeDrive 24/Joinity allows you to connect within minutes with a medical provider 24 hours a day, seven days a week via a mobile device or tablet or logging into a secure website from your computer. You can access Doron Hooper from anywhere in the [place]. A virtual visit might be right for you when you have a simple condition and feel like you just dont want to get out of bed, or cant get away from work for an appointment, when your [facility] is not available (evenings, weekends or holidays), or when youre out of town and need minor care. Electronic visits cost only $49 and if the [employer]/Jo[employer] provider determines a prescription is needed to treat your condition, one can be electronically transmitted to a nearby pharmacy*. Please take a moment to enroll today if you have not already done so. The enrollment process is free and takes just a few minutes. To enroll, please download the [employer]Joinity vandana to your tablet or phone, or visit [email or web address] to enroll on your computer. And, as an [address] [place] [address] patient with a uTaP account, the results of your visits will be scanned into your electronic medical record and your primary care provider will be able to view the scanned results. We urge you to continue to see your regular [facility] provider for your ongoing medical care. And while your primary care provider may not be the one available when you seek a Doron Hooper virtual visit, the peace of mind you get from getting a real diagnosis real time can be priceless. For more information on Doron Estradasaritafin, view our Frequently Asked Questions (FAQs) at [email or web address]. Sincerely, 
 
Yulia Swain MD 
Chief Medical Officer Sol Lanza *:  certain medications cannot be prescribed via [facility] Seanwillie Unresulted Labs-Please follow up with your PCP about these lab tests Order Current Status XR RETROGRADE PYELOGRAM BILAT In process Providers Seen During Your Hospitalization Provider Specialty Primary office phone Raisa Bhatt MD Urology [phone] Your Primary Care Physician (PCP) Primary Care Physician Office Phone Office Fax Elizabeth Steven [phone] You are allergic to the following Allergen Reactions Pcn (Penicillins) Unknown (comments) Tetanus Vaccines And Toxoid Unknown (comments) Recent Documentation Height Weight BMI Smoking Status 1.727 m 102.5 kg 34.36 kg/m2 Former Smoker Emergency Contacts Name Discharge Info Relation Home Work Mobile Evi Isaac DISCHARGE CAREGIVER [3] Spouse [3] [phone] Patient Belongings The following personal items are in your possession at time of discharge: 
  Dental Appliances: Partials, Uppers  Visual Aid: Glasses      Home Medications: None   Jewelry: None  Clothing: Pants, Shirt, Undergarments, Socks, Footwear, Jacket/Coat    Other Valuables: Eyeglasses, Aldona Antione Discharge Instructions Attachments/References OXYCODONE/ACETAMINOPHEN (BY MOUTH) (ENGLISH) Patient Handouts Oxycodone/Acetaminophen (By mouth) Acetaminophen (h-pdsk-b-MIN-oh-fen), Oxycodone Hydrochloride (ki-g-RZV-done madina-droe-KLOR-mckenna) Treats moderate to moderately severe pain. This medicine is a narcotic pain reliever. Brand Name(s): Endocet, Percocet, Primlev, Xartemis XR There may be other brand names for this medicine. When This Medicine Should Not Be Used: This medicine is not right for everyone. Do not use it if you had an allergic reaction to acetaminophen or oxycodone, or if you have serious breathing problems or paralytic ileus. How to Use This Medicine:  
Capsule, Liquid, Tablet, Long Acting Tablet · Your doctor will tell you how much medicine to use. Do not use more than directed. · An overdose can be dangerous. Follow directions carefully so you do not get too much medicine at one time. · Oral liquid: Measure the oral liquid medicine with a marked measuring spoon, oral syringe, or medicine cup. · Swallow the extended-release tablet whole. Do not crush, break, or chew it. Do not lick or wet the tablet before placing it in your mouth. Do not give this medicine through a feeding tube. · This medicine should come with a Medication Guide. Ask your pharmacist for a copy if you do not have one. · Missed dose: If you miss a dose of this medicine, skip the missed dose and go back to your regular dosing schedule. Do not double doses. · Store the medicine in a closed container at room temperature, away from heat, moisture, and direct light. Ask your pharmacist about the best way to dispose of medicine you do not use. Drugs and Foods to Avoid: Ask your doctor or pharmacist before using any other medicine, including over-the-counter medicines, vitamins, and herbal products. · Do not use Xartemis XR if you are using or have used an MAO inhibitor in the past 14 days. · Some medicines can affect how this medicine works. Tell your doctor if you are using any of the following: ¨ Carbamazepine, erythromycin, ketoconazole, lamotrigine, mirtazapine, naltrexone, phenytoin, propranolol, rifampin, ritonavir, tramadol, trazodone, or zidovudine ¨ Birth control pills ¨ Diuretic (water pill) ¨ Medicine to treat depression ¨ Phenothiazine medicine ¨ Triptan medicine to treat migraine headaches · Do not drink alcohol while you are using this medicine.  Acetaminophen can damage your liver, and alcohol can increase this risk. Do not take acetaminophen without asking your doctor if you have 3 or more drinks of alcohol every day. · Tell your doctor if you use anything else that makes you sleepy. Some examples are allergy medicine, narcotic pain medicine, and alcohol. Tell your doctor if you are using buprenorphine, butorphanol, nalbuphine, pentazocine, a benzodiazepine, or a muscle relaxer. Warnings While Using This Medicine: · Tell your doctor if you are pregnant or breastfeeding, or if you have kidney disease, liver disease, heart disease, low blood pressure, breathing problems or lung disease (such as asthma, COPD), thyroid problems, Anjum disease, pancreas or gallbladder problems, prostate problems, trouble urinating, or a stomach problems, or a history of head injury or brain damage, seizures, or alcohol or drug abuse. Tell your doctor if you are allergic to codeine. · This medicine may cause the following problems: 
¨ High risk of overdose, which can lead to death ¨ Respiratory depression (serious breathing problem that can be life-threatening) ¨ Liver problems ¨ Serious skin reactions ¨ Serotonin syndrome (when used with certain medicines) · This medicine may make you dizzy or drowsy. Do not drive or do anything that could be dangerous until you know how this medicine affects you. Sit or lie down if you feel dizzy. Stand up carefully. · This medicine contains acetaminophen. Read the labels of all other medicines you are using to see if they also contain acetaminophen, or ask your doctor or pharmacist. Leopoldo Freeze not use more than 4 grams (4,000 milligrams) total of acetaminophen in one day. · This medicine can be habit-forming. Do not use more than your prescribed dose. Call your doctor if you think your medicine is not working. · Do not stop using this medicine suddenly. Your doctor will need to slowly decrease your dose before you stop it completely. · This medicine could cause infertility. Talk with your doctor before using this medicine if you plan to have children. · This medicine may cause constipation, especially with long-term use. Ask your doctor if you should use a laxative to prevent and treat constipation. · Keep all medicine out of the reach of children. Never share your medicine with anyone. Possible Side Effects While Using This Medicine:  
Call your doctor right away if you notice any of these side effects: · Allergic reaction: Itching or hives, swelling in your face or hands, swelling or tingling in your mouth or throat, chest tightness, trouble breathing · Anxiety, restlessness, fast heartbeat, fever, muscle spasms, twitching, diarrhea, seeing or hearing things that are not there · Blistering, peeling, red skin rash · Blue lips, fingernails, or skin · Dark urine or pale stools, loss of appetite, stomach pain, yellow skin or eyes · Extreme weakness, shallow breathing, uneven heartbeat, seizures, sweating, or cold or clammy skin · Severe confusion, lightheadedness, dizziness, or fainting · Severe constipation, nausea, or vomiting · Trouble breathing or slow breathing If you notice these less serious side effects, talk with your doctor:  
· Headache · Mild constipation, nausea, or vomiting · Mild sleepiness or drowsiness If you notice other side effects that you think are caused by this medicine, tell your doctor. Call your doctor for medical advice about side effects. You may report side effects to FDA at 4-462-FDA-1633 © 2017 [address] Information is for End User's use only and may not be sold, redistributed or otherwise used for commercial purposes. The above information is an  only. It is not intended as medical advice for individual conditions or treatments. Talk to your doctor, nurse or pharmacist before following any medical regimen to see if it is safe and effective for you. Please provide this summary of care documentation to your next provider. Signatures-by signing, you are acknowledging that this After Visit Summary has been reviewed with you and you have received a copy. Patient Signature:  ____________________________________________________________ Date:  ____________________________________________________________  
  
Rosalina Doron Provider Signature:  ____________________________________________________________ Date:  ____________________________________________________________

## 2018-03-27 NOTE — PERIOP NOTES
Patient slept for about 25 minutes after arriving in PACU. Awakened slowly. Stated he was having pain however still drowsy. Hesitant to give IV narcotics for this reason. Once patient more awake crackers and ginger ale provided while still in phase one. Percocet administered. See MAR. Verbal phone update given to wife in waiting room.

## 2018-03-27 NOTE — INTERVAL H&P NOTE
H&P Update:  Gabe Mancera was seen and examined. History and physical has been reviewed. The patient has been examined.  There have been no significant clinical changes since the completion of the originally dated History and Physical.    Signed By: Misti Paz MD     March 27, 2018 12:59 PM

## 2018-03-27 NOTE — BRIEF OP NOTE
BRIEF OPERATIVE NOTE    Date of Procedure: 3/27/2018   Preoperative Diagnosis: Metastatic Prostate cancer ([address] Karli Garciain, ROSALIE, Right Hydronephrosis, hematuria  Postoperative Diagnosis: same as above  Procedure(s):  CYSTOSCOPY  Bilateral RPG,  Rt ureteral Stent placement  Right ureteral dilation  TRANSURETHRAL RESECTION OF RIGHT URETERAL ORIFICE / PROSTATE  TRANSURETHRAL RESECTION OF PROSTATE  PROSTATE BIOPSY NEEDLE/ULTRASOUND/FORTEC  INTRAOP FLUOROSCOPY WITH INTERPRETATION 2 hr  Surgeon(s) and Role:     * Izzy Chaparro MD - Primary         Assistant Staff: None      Surgical Staff:  Circ-1: Gaurav Rodriguez  Radiology Technician: Homero Gonzalez  Scrub Tech-1: Doreatha Star  Surg Asst-1: Adele Rekristina  Event Time In   Incision Start  1:39 PM   Incision Close      Anesthesia: General   Estimated Blood Loss: 25ML  Specimens:   ID Type Source Tests Collected by Time Destination   1 : Prostate chips Preservative Dg Chaparro MD 3/27/2018  2:09 PM Pathology   2 : right base prostate biopsy Preservative Dg Chaparro MD 3/27/2018  4:47 PM Pathology   3 : right base lateral prostate biopsy Preservative Dg Chaparro MD 3/27/2018  4:48 PM Pathology   4 : right mid prostate biopsy Preservative Dg Chaparro MD 3/27/2018  4:50 PM Pathology   5 : right mid lateral prostate biopsy Preservative Dg Chaparro MD 3/27/2018  4:51 PM Pathology   6 : right apex prostate biopsy Preservative Dg Chaparro MD 3/27/2018  4:52 PM Pathology   7 : right apex lateral prostate biopsy Preservative Dg Chaparro MD 3/27/2018  4:52 PM Pathology   8 : left base prostate biopsy Preservative Dg Chaparro MD 3/27/2018  4:53 PM Pathology   9 : left base lateral prostate biopsy Preservative Dg Chaparro MD 3/27/2018  4:54 PM Pathology   10 : left mid prostate biopsy Preservative Prostate  Chase Serrato MD 3/27/2018  4:54 PM Pathology   11 : left mid lateral prostate biopsy Preservative Prostate  Chase Serrato MD 3/27/2018  4:55 PM Pathology   12 : left apex prostate biopsy Preservative Prostate  Chase Serrato MD 3/27/2018  4:55 PM Pathology   13 : left apex lateral prostate biopsy Preservative Prostate  Chase Serrato MD 3/27/2018  4:56 PM Pathology      Findings: Enlarged prostate with large median lobe encasing both ureters, Right severe hydronephrosis, normal left RPG,  large palpable Prostate nodules  Complications: None  Implants:   Implant Name Type Inv.  Item Serial No.  Lot No. LRB No. Used [facility] DBL-PGTL [id number] [facility] DBL-PGTL [id number] -- Sena [employer] [facility] [id number] N/A 1 Implanted     DRAIN: 22Fr shell  Dictation: # [id number] and [id number]

## 2018-03-27 NOTE — ANESTHESIA PREPROCEDURE EVALUATION
Anesthetic History   No history of anesthetic complications            Review of Systems / Medical History  Patient summary reviewed and pertinent labs reviewed    Pulmonary  Within defined limits                 Neuro/Psych   Within defined limits           Cardiovascular    Hypertension: well controlled          CAD and cardiac stents    Exercise tolerance: <4 METS     GI/Hepatic/Renal  Within defined limits              Endo/Other        Cancer (Prostate cancer with mets to bone)     Other Findings   Comments: Documentation of current medication  Current medications obtained, documented and obtained? YES      Risk Factors for Postoperative nausea/vomiting:       History of postoperative nausea/vomiting? NO       Female? NO       Motion sickness? NO       Intended opioid administration for postoperative analgesia? NO      Smoking Abstinence:  Current Smoker? NO  Elective Surgery? YES  Seen preoperatively by anesthesiologist or proxy prior to day of surgery? YES  Pt abstained from smoking 24 hours prior to anesthesia?  N/A    Preventive care/screening for High Blood Pressure:  Aged 18 years and older: YES  Screened for high blood pressure: YES  Patients with high blood pressure referred to primary care provider   for BP management: YES               Physical Exam    Airway  Mallampati: II  TM Distance: 4 - 6 cm  Neck ROM: normal range of motion   Mouth opening: Normal     Cardiovascular  Regular rate and rhythm,  S1 and S2 normal,  no murmur, click, rub, or gallop             Dental    Dentition: Upper partial plate     Pulmonary  Breath sounds clear to auscultation               Abdominal  GI exam deferred       Other Findings            Anesthetic Plan    ASA: 3  Anesthesia type: general          Induction: Intravenous  Anesthetic plan and risks discussed with: Patient

## 2018-03-28 NOTE — OP NOTES
[address] [place] [address]   OPERATIVE REPORT    Efren Scott  MR#: [id number]  : 1940  ACCOUNT #: [de-identified]   DATE OF SERVICE: 2018    PREOPERATIVE DIAGNOSIS:  Metastatic prostate cancer and right severe hydronephrosis, acute kidney injury. POSTOPERATIVE DIAGNOSIS:  Metastatic prostate cancer and right severe hydronephrosis, acute kidney injury. PROCEDURES PERFORMED:  Cystoscopy, bilateral retrograde pyelogram (difficult right retrograde pyelogram), right ureteral stent placement, transurethral resection of prostate, transurethral resection of right ureteral orifice/prostate, transrectal ultrasound, prostate biopsy. SURGEON:  Didi Nash MD    ASSISTANT:  None     ANESTHESIA:  General.    ANTIBIOTIC/ANTIFUNGAL:  Diflucan and Cipro. ESTIMATED BLOOD LOSS:  25 mL. SPECIMENS REMOVED:  1. Prostate chips. 2.  Right base prostate biopsy. 3.  Right base lateral prostate biopsy. 4.  Right median prostate biopsy. 5.  Right medial and lateral prostate biopsy. 6.  Right apex prostate biopsy. 7.  Right apex lateral prostate biopsy. 8.  Left base prostate biopsy. 9.  Left base lateral prostate biopsy. 10.  Left median prostate biopsy. 11.  Left medial and lateral prostate biopsy. 12.  Left apex prostate biopsy. 13.  Left apex lateral prostate biopsy. COMPLICATIONS:  None     IMPLANTS:  None     FINDINGS:  A severely enlarged prostate corresponding to suspected prostate cancer encasing the trigone and the ureteral orifices. Right severe hydronephrosis. Normal Left Retrograde pyelogram.  Large palpable prostate nodules. DRAIN:  A 6 x 28-Honduran double J stent on the right, a 22-Honduran Stevens catheter.     INDICATION:  This is a 69-year-old gentleman who presented with hematuria to an outside hospital and was treated for a UTI on that admission, he was found to have acute kidney injury and CT scan was performed that showed a severely enlarged prostate, lesion encasing the ureters with bony lesions suspicious for metastatic prostate cancer. His PSA was 150. There was also severe hydronephrosis on the right side seen on the CT scan. His creatinine at that point was 2.1 suspecting acute kidney injury from the obstruction. A decision was made to perform a tissue diagnosis for prostate cancer and to place a right ureteral stent to relieve the obstruction on the right urinary system. Risks, benefits, alternatives were discussed with the patient. Of note, he is set up to have palliative radiation therapy for his bony pains from the bony lesions of the metastatic prostate cancer. PROCEDURE:  The patient was identified, and brought into the operating room and anesthesia was induced. He was placed in dorsal lithotomy position. All pressure points were appropriately padded. He received a dose of ciprofloxacin and Diflucan. The genitalia was then prepped and draped in normal sterile fashion. I performed a time-out. Using a 21-[place] citizen sheath and a 30-degree lens scope, I performed a cystourethroscopy. The lateral lobes of the prostate appeared open from previous laser vaporization of the prostate. There was a large median lobe, component which appeared very nodular and calcified. This nodule extended to and involved the trigone obscuring both ureteral orifices. With great difficulty, the left ureteral orifice was identified. With severe difficulty, I was eventually able to cannulate the left ureteral orifice using an angled 5-[place] citizen Kumpe catheter. The wire was placed up into the kidney, verified fluoroscopically. I then exchanged the Kumpe catheter for a 5-[place] citizen open ended catheter and it was placed up into the proximal ureter. A retrograde pyelogram was obtained and there was no hydronephrosis. There was good drainage from the left ureteral orifice. The next part of the case took a severely long time trying to identify the right ureteral orifice. Methylene blue was given and after 10 minutes, there was still no efflux from the right side. There was no efflux of blue dye from the left either, but there was clear urine from the left ureter. With great difficulty, I was able to identify the right ureteral orifice buried deep in the encased prostatic lesion. I used a 5-[place] angled catheter to intubate and I was able to pass a Sensor wire up into the kidney. A retrograde pyelogram was obtained and showed a severely dilated ureter and a severe hydronephrosis with a proximal J hooking of the ureter. With severe maneuvering, I was able to unkink the proximal ureter with the 5-[place] open ended catheter and a Super Stiff wire was passed up into the kidney. Multiple attempts at placing a 6-[place] ureteral stent was met with severe resistance at the ureteral orifice. I then dilated the ureter with ureteral dilators from a 6-[place] to a 12-[place]. Sensor wire was then replaced. Again, multiple attempts at placing an 8-[place] stent, ureteral catheter was met with resistance at the intramural ureter. Decision was made at this point to resect the nodule, so using an Carmichael resectoscope, I resected the floor of the prostatic urethra. I replaced the Sensor wire with a 5-[place] open ended catheter and I resected the trigone on the right side and the intramural ureter sharply with the cutting current. The 2 specimens were sent for pathology. With great difficulty, I was able to recannulate the right ureter ureteral orifice by placing a Sensor wire up into the kidney. A retrograde pyelogram was then performed, and there was still severe hydronephrosis and the wire was in the right place, there was no injury noted. With the wire left in place after several attempts, taking over 30 minutes, I was eventually able to place a 6 x 28-[place] double J stent into the right side with a proximal curl in the renal pelvis and a distal curl in the bladder.   The bladder was emptied, the prostatic chips were sent for specimen. Hemostasis was obtained in the bladder and the prostatic urethra. A 22-[place] Stevens catheter was then placed and the balloon was filled with 15 mL of sterile water. The Stevens catheter was attached to a leg bag. The patient was then put in left lateral decubitus position. I then inserted the Gigi Hillec transrectal ultrasound probe and took a surveillance of the prostate. Prior to this, a digital rectal exam confirmed a very enlarged prostate greater than 100 g and multiple palpable nodules. With the ultrasound in place, I was able to identify several areas of hyperechoic and hyperechoic lesions. The prostate was estimated to be about 120 g. I the performed the prostate biopsy in the usual standard fashion by taking 2 samples from the right base, right median, right apex and left base, left median, left apex. Pressure was held against the prostate. The sample was sent for pathology. At this point, the patient was put in prone position and awoken from anesthesia, transferred to PACU in stable condition.       MD HARDEEP Solis / [place]  D: 03/27/2018 17:30     T: 03/27/2018 19:53  JOB #: [id number]

## 2018-03-28 NOTE — OP NOTES
[facility]  OPERATIVE REPORT    Dennie Ralph  MR#: [id number]  : 1940  ACCOUNT #: [de-identified]   DATE OF SERVICE: 2018    ADDENDUM to dictation #[id number]. PROCEDURE:  Fluoroscopy with intraoperative interpretation of 2 hours.       MD HARDEEP Aleman / [place]  D: 2018 17:54     T: 2018 20:19  JOB #: [id number]

## 2018-05-09 PROBLEM — K21.9 ESOPHAGEAL REFLUX: Status: ACTIVE | Noted: 2018-05-09

## 2018-05-09 PROBLEM — R33.8 BENIGN PROSTATIC HYPERPLASIA WITH URINARY RETENTION: Status: ACTIVE | Noted: 2017-04-05

## 2018-05-09 PROBLEM — N40.1 BENIGN PROSTATIC HYPERPLASIA WITH URINARY RETENTION: Status: ACTIVE | Noted: 2017-04-05

## 2018-05-09 PROBLEM — N17.9 ARF (ACUTE RENAL FAILURE) (HCC): Status: ACTIVE | Noted: 2017-03-10

## 2018-05-09 PROBLEM — J06.0 ACUTE LARYNGOPHARYNGITIS: Status: ACTIVE | Noted: 2018-05-09

## 2018-05-09 PROBLEM — I10 ESSENTIAL HYPERTENSION, BENIGN: Status: ACTIVE | Noted: 2018-05-09

## 2018-05-09 PROBLEM — I25.10 CORONARY ATHEROSCLEROSIS: Status: ACTIVE | Noted: 2018-05-09

## 2018-05-09 PROBLEM — E78.5 OTHER AND UNSPECIFIED HYPERLIPIDEMIA: Status: ACTIVE | Noted: 2018-05-09

## 2018-05-09 PROBLEM — N17.9 ACUTE RENAL FAILURE (HCC): Status: ACTIVE | Noted: 2017-03-10

## 2018-05-09 PROBLEM — R73.01 IMPAIRED FASTING GLUCOSE: Status: ACTIVE | Noted: 2018-05-09

## 2018-05-09 PROBLEM — E11.9 CONTROLLED TYPE 2 DIABETES MELLITUS WITHOUT COMPLICATION (HCC): Status: ACTIVE | Noted: 2018-05-09

## 2018-05-09 PROBLEM — F52.8 PSYCHOSEXUAL DYSFUNCTION WITH INHIBITED SEXUAL EXCITEMENT: Status: ACTIVE | Noted: 2018-05-09

## 2018-05-09 PROBLEM — H10.9 CONJUNCTIVITIS: Status: ACTIVE | Noted: 2018-05-09

## 2018-09-11 PROBLEM — E11.21 TYPE 2 DIABETES WITH NEPHROPATHY (HCC): Status: ACTIVE | Noted: 2018-09-11

## 2020-09-30 PROBLEM — I45.2 RIGHT BUNDLE BRANCH BLOCK (RBBB) PLUS LEFT ANTERIOR (LA) HEMIBLOCK: Status: ACTIVE | Noted: 2020-07-23

## 2020-09-30 PROBLEM — Z95.5 H/O HEART ARTERY STENT: Status: ACTIVE | Noted: 2020-07-23

## 2021-08-03 PROBLEM — I10 HTN (HYPERTENSION): Status: RESOLVED | Noted: 2021-01-01 | Resolved: 2021-01-01

## 2021-08-03 PROBLEM — N17.9 ARF (ACUTE RENAL FAILURE) (HCC): Status: RESOLVED | Noted: 2017-03-10 | Resolved: 2021-01-01

## 2021-08-03 PROBLEM — E78.5 OTHER AND UNSPECIFIED HYPERLIPIDEMIA: Status: RESOLVED | Noted: 2018-05-09 | Resolved: 2021-01-01

## 2021-08-11 PROBLEM — R31.0 GROSS HEMATURIA: Status: ACTIVE | Noted: 2021-01-01

## (undated) DEVICE — (D)SYR 10ML 1/5ML GRAD NSAF -- PKGING CHANGE USE ITEM 338027

## (undated) DEVICE — OPEN-END FLEXI-TIP URETERAL CATHETER: Brand: FLEXI-TIP

## (undated) DEVICE — 4-PORT MANIFOLD: Brand: NEPTUNE 2

## (undated) DEVICE — TRAY PREP DRY W/ PREM GLV 2 APPL 6 SPNG 2 UNDPD 1 OVERWRAP

## (undated) DEVICE — KENDALL SCD EXPRESS SLEEVES, KNEE LENGTH, MEDIUM: Brand: KENDALL SCD

## (undated) DEVICE — CATHETER URET L65CM DIA5FR BERN IMAGER II

## (undated) DEVICE — 3M™ BAIR PAWS FLEX™ WARMING GOWN, STANDARD, 20 PER CASE 81003: Brand: BAIR PAWS™

## (undated) DEVICE — LUB SURG MEDC STRL 2OZ TUBE MC -- MEDICHOICE

## (undated) DEVICE — LUB JELLY PETROLEUM 1 OZ -- CONVERT TO ITEM 357604

## (undated) DEVICE — Y-TYPE TUR/BLADDER IRRIGATION SET, REGULATING CLAMP

## (undated) DEVICE — SOLUTION IV 1000ML 0.9% SOD CHL

## (undated) DEVICE — GAUZE SPONGES,16 PLY: Brand: CURITY

## (undated) DEVICE — BAG DRAINAGE CUST DISP

## (undated) DEVICE — MEDI-VAC NON-CONDUCTIVE SUCTION TUBING: Brand: CARDINAL HEALTH

## (undated) DEVICE — MAX-CORE® DISPOSABLE CORE BIOPSY INSTRUMENT, 18G X 20CM: Brand: MAX-CORE

## (undated) DEVICE — GDWIRE 3CM FLX-TIP 0.038X150CM -- BX/5 SENSOR

## (undated) DEVICE — STER SINGLE BASIN SET W/BOWLS: Brand: CARDINAL HEALTH

## (undated) DEVICE — GOWN,PREVENTION PLUS,XLN/XL,ST,24/CS: Brand: MEDLINE

## (undated) DEVICE — Device

## (undated) DEVICE — Device: Brand: OLYMPUS

## (undated) DEVICE — STERILE POLYISOPRENE POWDER-FREE SURGICAL GLOVES: Brand: PROTEXIS

## (undated) DEVICE — SOLUTION IRRIG 3000ML 0.9% SOD CHL FLX CONT 0797208] ICU MEDICAL INC]

## (undated) DEVICE — Z DUP USE 2565107 PACK SURG PROC LEG CYSTO T-DRAPE REINF TBL CVR HND TWL

## (undated) DEVICE — KIT CLN UP BON SECOURS MARYV